# Patient Record
(demographics unavailable — no encounter records)

---

## 2024-12-23 NOTE — PHYSICAL EXAM
[de-identified] : Constitutional; Appears well, no apparent distress Ability to communicate: Normal  Respiratory: non-labored breathing Skin: No rash noted Head: Normocephalic, atraumatic Neck: no visible thyroid enlargement Eyes: Extraocular movements intact Neurologic: Alert and oriented x3 Psychiatric: normal mood, affect and behavior [] : non-antalgic

## 2024-12-23 NOTE — DISCUSSION/SUMMARY
[de-identified] : After discussing various treatment options with the patient including but not limited to oral medications, physical therapy, exercise modalities as well as interventional spinal injections, we have decided with the following plan:  - Continue Home exercises, stretching, activity modification, physical therapy, and conservative care. - MRI report and/or images was reviewed and discussed with the patient. - Recommend L5-S1 Lumbar Epidural Steroid Injection under fluoroscopic guidance with image. (LEFT)  - The risks, benefits and alternatives of the proposed procedure were explained in detail with the patient. The risks outlined include but are not limited to infection, bleeding, post-dural puncture headache, nerve injury, a temporary increase in pain, failure to resolve symptoms, allergic reaction, symptom recurrence, and possible elevation of blood sugar in diabetics. All questions were answered to patient's apparent satisfaction and he/she verbalized an understanding. - Patient is presenting with acute/sub-acute radicular pain with impairment in ADLs and functionality.  The pain has not responded to conservative care including NSAID therapy and/or physical therapy.  There is no bleeding tendency, unstable medical condition, or systemic infection. - Follow up in 1-2 weeks post injection for re-evaluation. - Will provide prescription for Physical Therapy. - Recommend Meloxicam 15mg daily PRN. Potential adverse effects including but not limited to bleeding, ulcers, increased risk of hypertension, heart disease, kidney disease and stroke were discussed with the patient.  Medication would allow patient to be more mobile and perform ADL's.  Will continue to monitor patient and attempt to wean as soon as possible. Will use the lowest dosage possible for the shortest possible period of time.

## 2025-01-16 NOTE — PROCEDURE
[FreeTextEntry3] : Date of Service: 01/16/2025   Account: [id number]  Patient: ANA ALEMAN   YOB: 1974  Age: 50 year   Surgeon:                                                         Timi Travis D.O.  Pre-Operative Diagnosis:                             Lumbosacral radiculitis  Post-Operative Diagnosis:                           Same  Procedure:                                                      Interlaminar lumbar epidural steroid injection (L5-S1) under fluoroscopic guidance  Anesthesia:                                                     Local with MAC   This procedure was carried out using fluoroscopic guidance.  The risks and benefits of the procedure were discussed extensively with the patient.  The consent of the patient was obtained and the following procedure was performed.  The patient was placed in the prone position.  The lumbar area was prepped and draped in a sterile fashion.  A timeout was performed with all essential staff present and the site and side were verified. Under AP view with slight cephalad-caudad angulation, the L5-S1 interspace was identified and marked.  Using sterile technique, the superficial skin was anesthetized with 1% Lidocaine without epinephrine.  A 20-gauge Tuohy needle was advanced into the epidural space under fluoroscopy using cuaqm-aifmzzydb-dwjcl technique and using loss of resistance at the L5-S1 level.  After negative aspiration for heme or CSF, an epidurogram was obtained using 2-3 cc Omnipaque contrast injected under live fluoroscopy, confirming epidural placement of the needle.    Epidurogram showed no evidence of intrathecal or intravascular flow, and good evidence of bilateral epidural flow from L3-S2 levels.  After this, 4 cc of preservative free normal saline plus 12 mg of betamethasone were injected into the epidural space.  The needle was subsequently removed.  Anesthesia personnel were present throughout the procedure.  The patient tolerated the procedure well and was instructed to contact me immediately if there were any problems.  Timi Travis D.O.

## 2025-02-24 NOTE — ASSESSMENT
[FreeTextEntry1] : ECG-sinus rhythm at 85 bpm.  Left atrial enlargement. LAFB,  Borderline LVH.  R VCD  rightward axis deviation\par  \par  Lab data \par  ----1/24/20--4/8/22--\par  Chol---249----235\par  LDL---142----NA\par  HDL----42----49\par  Trig----302--301\par  Electrolytes and LFTs are normal\par  \par  Echocardiogram 1/12/2021:\par  Left ventricle ejection fraction 65 to 70%\par  No significant valvular lesions\par  Pulmonary artery mildly dilated\par  Pulmonary artery pressures could not be determined\par  \par  Impression:\par  1.  Lower extremity edema has resolved off of furosemide therapy, likely a consequence of improvement in diet.\par  \par  2. He has significant mixed hyperlipidemia and has not had repeat blood work on rosuvastatin 10 mg \par  \par  3.  Previously impressive exercise regimen has now become an active\par  .\par  4.  BMI 34.8 after 35 pound weight loss.\par  \par  5.  Continues to use e-cigarettes.  Great difficulty weaning off.\par  \par  6. Echo with normal LVEF and trace regurgitation.No PAH\par  \par  7. No SOB or chest pain.  Palps when drinking red bulls.\par  \par  8. 2016 Nuclear stress negative for ischemia.  \par  \par  9.  Blood pressure generally controlled\par  \par  Plan:\par  1.  As there is no edema and blood pressures controlled may remain off of diuretic therapy.\par  \par  2.  Strict instructions regarding carbohydrates in his triglyceridemia and obesity were discussed.\par  \par  3.  Absolute smoking cessation and caffeine abstention\par  \par  4. Improve dietary choices.Avoid fast foods, processed foods and Na+. Incorporate more lean proteins and     greens daily.  Need for avoiding carbohydrates strongly reinforced.\par  \par  5. Regular exercise was encouraged.\par  \par  6.  Continue rosuvastatin and obtain repeat blood work now\par

## 2025-02-24 NOTE — HISTORY OF PRESENT ILLNESS
[FreeTextEntry1] : History includes:\par  \par  1.	Fluctuating degrees of hypertension.\par  2.	A history of palpitations in the past.\par  3.	Hyperlipidemia.\par  4.	Obesity.\par  5.	Very mild LAD bridge.\par  \par

## 2025-02-24 NOTE — REASON FOR VISIT
[FreeTextEntry1] : Patient states that Edema still resolved.  Stopped using Lasix and has done well with well-controlled blood pressure.\par  Dietary inconsistencies and 15 pounds of weight gain.\par  \par  -Still dinks red bulls daily \par  -Still uses-cigerette/ Tob, Again!\par  -Diet much improved\par  -Weigh loss is reported\par  \par  Denies any chest pain, orthopnea or SOB.

## 2025-02-24 NOTE — PHYSICAL EXAM
[General Appearance - Well Developed] : well developed [Normal Appearance] : normal appearance [General Appearance - Well Nourished] : well nourished [Normal Conjunctiva] : the conjunctiva exhibited no abnormalities [Normal Jugular Venous V Waves Present] : normal jugular venous V waves present [Respiration, Rhythm And Depth] : normal respiratory rhythm and effort [Exaggerated Use Of Accessory Muscles For Inspiration] : no accessory muscle use [Bowel Sounds] : normal bowel sounds [Abdomen Soft] : soft [Abdomen Tenderness] : non-tender [Abnormal Walk] : normal gait [Gait - Sufficient For Exercise Testing] : the gait was sufficient for exercise testing [Nail Clubbing] : no clubbing of the fingernails [Cyanosis, Localized] : no localized cyanosis [Petechial Hemorrhages (___cm)] : no petechial hemorrhages [Nail Splinter Hemorrhages] : no splinter hemorrhages of the nails [Fingers Osler's Nodes] : Osler's nodes were not seenon the fingers [FreeTextEntry1] : No evidence of edema. [Skin Color & Pigmentation] : normal skin color and pigmentation [Skin Turgor] : normal skin turgor [] : no rash [Oriented To Time, Place, And Person] : oriented to person, place, and time [Impaired Insight] : insight and judgment were intact [Affect] : the affect was normal

## 2025-06-10 NOTE — ASSESSMENT
[FreeTextEntry1] :  ECG-sinus rhythm at 74 bpm. Left atrial enlargement. LAFB, Borderline LVH.   ECG obtained to assist in diagnosis and management of assessed problem(s).  Lab data  ----1/24/20--4/8/22-- Chol---249----235 LDL---142----NA HDL----42----49 Trig----302--301 Electrolytes and LFTs are normal  Echocardiogram 1/12/2021: Left ventricle ejection fraction 65 to 70% No significant valvular lesions Pulmonary artery mildly dilated Pulmonary artery pressures could not be determined  Impression:  1. Lower extremity edema has resolved off of furosemide therapy.  2. He has significant mixed hyperlipidemia and has not had repeat blood work on rosuvastatin 10 mg  3.  Blood pressure has remained well-controlled especially with the weight loss.  4. BMI back down to 35 after 16 pounds of weight loss.  5. Continues to use e-cigarettes. Great difficulty weaning off.  6. Echo with normal LVEF and trace regurgitation.  No PAH  7. No SOB or chest pain. Palps when drinking red bulls.  8. 2016 Nuclear stress negative for ischemia.   Plan:  1.  Maintain and continue with weight loss.  2. Strict instructions regarding carbohydrates in his triglyceridemia and obesity were discussed.  3. Absolute smoking cessation and caffeine abstention  4. Improve dietary choices.Avoid fast foods, processed foods and Na+. Incorporate more lean proteins and greens daily. Need for avoiding carbohydrates strongly reinforced.  5. Regular exercise was encouraged.  6.  Obtain repeat blood work then resume rosuvastatin.

## 2025-06-10 NOTE — PHYSICAL EXAM
[General Appearance - Well Developed] : well developed [Normal Appearance] : normal appearance [General Appearance - Well Nourished] : well nourished [Normal Conjunctiva] : the conjunctiva exhibited no abnormalities [Normal Jugular Venous V Waves Present] : normal jugular venous V waves present [Respiration, Rhythm And Depth] : normal respiratory rhythm and effort [Exaggerated Use Of Accessory Muscles For Inspiration] : no accessory muscle use [Bowel Sounds] : normal bowel sounds [Abdomen Soft] : soft [Abdomen Tenderness] : non-tender [Abnormal Walk] : normal gait [Gait - Sufficient For Exercise Testing] : the gait was sufficient for exercise testing [Petechial Hemorrhages (___cm)] : no petechial hemorrhages [Cyanosis, Localized] : no localized cyanosis [Nail Clubbing] : no clubbing of the fingernails [Nail Splinter Hemorrhages] : no splinter hemorrhages of the nails [Fingers Osler's Nodes] : Osler's nodes were not seenon the fingers [Skin Color & Pigmentation] : normal skin color and pigmentation [] : no rash [Skin Turgor] : normal skin turgor [Oriented To Time, Place, And Person] : oriented to person, place, and time [Impaired Insight] : insight and judgment were intact [Affect] : the affect was normal [FreeTextEntry1] : No evidence of edema.

## 2025-06-10 NOTE — REASON FOR VISIT
[FreeTextEntry1] : More attentive to weight loss and maintaining it. 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  -Still dinks red bulls daily  -Still uses-E  cigs  Denies any chest pain, orthopnea or SOB.

## 2025-07-24 NOTE — DISCUSSION/SUMMARY
[de-identified] : After discussing various treatment options with the patient including but not limited to oral medications, physical therapy, exercise modalities as well as interventional spinal injections, we have decided with the following plan:  - Continue Home exercises, stretching, activity modification, physical therapy, and conservative care. - MRI report and/or images was reviewed and discussed with the patient. - Recommend L5-S1 Lumbar Epidural Steroid Injection under fluoroscopic guidance with image. (LEFT)  - The risks, benefits and alternatives of the proposed procedure were explained in detail with the patient. The risks outlined include but are not limited to infection, bleeding, post-dural puncture headache, nerve injury, a temporary increase in pain, failure to resolve symptoms, allergic reaction, symptom recurrence, and possible elevation of blood sugar in diabetics. All questions were answered to patient's apparent satisfaction and he/she verbalized an understanding. - Patient is presenting with acute/sub-acute radicular pain with impairment in ADLs and functionality.  The pain has not responded to conservative care including NSAID therapy and/or physical therapy.  There is no bleeding tendency, unstable medical condition, or systemic infection. - Follow up in 1-2 weeks post injection for re-evaluation. - Will provide prescription for Physical Therapy. - Recommend Meloxicam 15mg daily PRN. Potential adverse effects including but not limited to bleeding, ulcers, increased risk of hypertension, heart disease, kidney disease and stroke were discussed with the patient.  Medication would allow patient to be more mobile and perform ADL's.  Will continue to monitor patient and attempt to wean as soon as possible. Will use the lowest dosage possible for the shortest possible period of time.

## 2025-07-24 NOTE — PHYSICAL EXAM
[de-identified] : Constitutional; Appears well, no apparent distress Ability to communicate: Normal  Respiratory: non-labored breathing Skin: No rash noted Head: Normocephalic, atraumatic Neck: no visible thyroid enlargement Eyes: Extraocular movements intact Neurologic: Alert and oriented x3 Psychiatric: normal mood, affect and behavior [] : non-antalgic

## 2025-07-24 NOTE — HISTORY OF PRESENT ILLNESS
[Lower back] : lower back [10] : 10 [Dull/Aching] : dull/aching [Sharp] : sharp [Shooting] : shooting [Throbbing] : throbbing [Tingling] : tingling [Constant] : constant [Household chores] : household chores [Sleep] : sleep [Nothing helps with pain getting better] : Nothing helps with pain getting better [Sitting] : sitting [Standing] : standing [Walking] : walking [Stairs] : stairs [Lying in bed] : lying in bed [FreeTextEntry1] : 07/24/2025 : s/p L5-S on 01/16/25 with 50% relief and improvement of ADLS 12/23/2024: s/p L5-S1 LESI on 1/25/24 with >50% relief and improvement of ADLs. Pain worsening down the left leg and radiates to the foot described as a sharp shooting throbbing with associated numbness/tingling in the toes.   12/26/2023: s/p LEFT L4-5 L5-S1 TFESI on 11/30/23 with >50% relief and improvement of ADLs. Pain was great for 2 days and then started to return. Pain no longer radiating down the leg but still having pain radiating to the left buttock.   Initial HPI 11/09/2023: Pain started a few months ago and is on the LEFT side of the lower back and radiates into the left buttock and down the left posterior thigh and lower leg to the toes described as a sharp shooting pain with associated numbness and tingling. Saw Dr. Funez who recommended LESI.   MRI Lumbar Spine 10/21/23 independently reviewed: multilevel HNPs with nerve impingement.  Conservative Care:  has done PT with relief  Pain Medications: Advil PRN; gabapentin 100mg TID with no relief;  Past Injections: ESIs many years ago  Spine surgery: none  Blood thinners: none  [] : no [FreeTextEntry6] : numbness, weakness [FreeTextEntry7] : lt leg  [de-identified] : l mri  [TWNoteComboBox1] : 50%